# Patient Record
Sex: FEMALE | Race: WHITE | NOT HISPANIC OR LATINO | Employment: FULL TIME | URBAN - METROPOLITAN AREA
[De-identification: names, ages, dates, MRNs, and addresses within clinical notes are randomized per-mention and may not be internally consistent; named-entity substitution may affect disease eponyms.]

---

## 2017-02-24 ENCOUNTER — OFFICE VISIT (OUTPATIENT)
Dept: FAMILY MEDICINE | Facility: CLINIC | Age: 42
End: 2017-02-24
Payer: COMMERCIAL

## 2017-02-24 VITALS
BODY MASS INDEX: 20.74 KG/M2 | DIASTOLIC BLOOD PRESSURE: 82 MMHG | WEIGHT: 118 LBS | SYSTOLIC BLOOD PRESSURE: 110 MMHG | TEMPERATURE: 98.2 F | HEART RATE: 85 BPM

## 2017-02-24 DIAGNOSIS — J01.10 ACUTE NON-RECURRENT FRONTAL SINUSITIS: Primary | ICD-10-CM

## 2017-02-24 PROCEDURE — 99213 OFFICE O/P EST LOW 20 MIN: CPT | Performed by: INTERNAL MEDICINE

## 2017-02-24 RX ORDER — AZITHROMYCIN 250 MG/1
TABLET, FILM COATED ORAL
Qty: 6 TABLET | Refills: 0 | Status: SHIPPED | OUTPATIENT
Start: 2017-02-24 | End: 2018-06-22

## 2017-02-24 RX ORDER — CODEINE PHOSPHATE AND GUAIFENESIN 10; 100 MG/5ML; MG/5ML
1 SOLUTION ORAL EVERY 4 HOURS PRN
Qty: 120 ML | Refills: 0 | Status: SHIPPED | OUTPATIENT
Start: 2017-02-24 | End: 2018-06-22

## 2017-02-24 NOTE — NURSING NOTE
"Chief Complaint   Patient presents with     Sinus Problem     sinus congestion and head pressure for the last week. dry cough started yesterday and she noticed some white dots on her throat last night.        Initial /82 (BP Location: Left arm, Patient Position: Chair, Cuff Size: Adult Regular)  Pulse 85  Temp 98.2  F (36.8  C) (Tympanic)  Wt 118 lb (53.5 kg)  BMI 20.74 kg/m2 Estimated body mass index is 20.74 kg/(m^2) as calculated from the following:    Height as of 8/5/16: 5' 3.25\" (1.607 m).    Weight as of this encounter: 118 lb (53.5 kg).  Medication Reconciliation: complete   Yoana Pichardo CMA    "

## 2017-02-24 NOTE — PROGRESS NOTES
SUBJECTIVE:                                                    Melania Moya is a 41 year old female who presents to clinic today for the following health issues:    Chief Complaint   Patient presents with     Sinus Problem     sinus congestion and head pressure for the last week. dry cough started yesterday and she noticed some white dots on her throat last night.      ENT Symptoms           Symptoms: cc Present Absent Comment   Fever/Chills   x    Fatigue  x     Muscle Aches  x     Eye Irritation   x    Sneezing   x    Nasal Chip/Drg  x     Sinus Pressure/Pain  x  Frontal and maxillary   Loss of smell   x    Dental pain   x    Sore Throat  x  mild   Swollen Glands  x     Ear Pain/Fullness  x  bilateral   Cough  x  Dry and non productive, bad overnight   Wheeze   x    Chest Pain   x    Shortness of breath   x    Rash   x    Other  x  Headaches/pressure      Symptom duration:  a week   Symptom severity:  moderate   Treatments tried:  mucinex and dayquil    Contacts:  son had some congestion.        Problem list and histories reviewed & adjusted, as indicated.  Additional history: as documented    Current Outpatient Prescriptions   Medication Sig Dispense Refill     azithromycin (ZITHROMAX) 250 MG tablet Two tablets first day, then one tablet daily for four days. 6 tablet 0     guaiFENesin-codeine (ROBITUSSIN AC) 100-10 MG/5ML SOLN solution Take 5 mLs by mouth every 4 hours as needed for cough 120 mL 0     levonorgestrel (MIRENA) 20 MCG/24HR IUD 1 each (20 mcg) by Intrauterine route continuous       Multiple Vitamin (DAILY MULTIVITAMIN PO) Take  by mouth.       Allergies   Allergen Reactions     Bactrim [Sulfamethoxazole W-Trimethoprim] Hives     Penicillins Hives       ROS:  Constitutional, HEENT, pulmonary systems are negative, except as otherwise noted.    OBJECTIVE:                                                    /82 (BP Location: Left arm, Patient Position: Chair, Cuff Size: Adult Regular)   Pulse 85  Temp 98.2  F (36.8  C) (Tympanic)  Wt 118 lb (53.5 kg)  BMI 20.74 kg/m2  Body mass index is 20.74 kg/(m^2).    GENERAL: alert and no distress  EYES: Eyes grossly normal to inspection, PERRL and conjunctivae and sclerae normal  HENT: ear canals and TMs normal except for injection in right TM, frontal sinuses are tender to percussion, nose and mouth without ulcers or lesions, oropharynx red but no tonsillar exudates  NECK: no adenopathy, no asymmetry, masses, or scars  RESP: lungs clear to auscultation - no rales, rhonchi or wheezes  CV: regular rate and rhythm, normal S1 S2, no S3 or S4, no murmur, click or rub        ASSESSMENT/PLAN:                                                        1. Acute non-recurrent frontal sinusitis    Sinus symptoms are approaching the 10 day zahida and are continuing to worsen, so we'll treat with antibiotics.  She does have a penicillin allergy, so will use azithromycin.  Codeine cough syrup at night as needed.  Continue other OTCs.      - azithromycin (ZITHROMAX) 250 MG tablet; Two tablets first day, then one tablet daily for four days.  Dispense: 6 tablet; Refill: 0  - guaiFENesin-codeine (ROBITUSSIN AC) 100-10 MG/5ML SOLN solution; Take 5 mLs by mouth every 4 hours as needed for cough  Dispense: 120 mL; Refill: 0    Follow-up as needed.      Neri Stanton MD  [facility]

## 2017-02-24 NOTE — MR AVS SNAPSHOT
After Visit Summary   2/24/2017    Melania Moya    MRN: [id number]           Patient Information     Date Of Birth          1975        Visit Information        Provider Department      2/24/2017 11:20 AM Neri Stanton MD [facility]        Today's Diagnoses     Acute non-recurrent frontal sinusitis    -  1      Care Instructions    Try some Flonase to help your sinuses drain.  When spraying into the nose, aim towards the ears for the best effect.         Thank you for choosing [facility].  You may be receiving a survey in the mail from [facility] your visit today.  Please take a few minutes to complete and return the survey to let us know how we are doing.      Our Clinic hours are:  Mondays    7:20 am - 7 pm  Tues -  Fri  7:20 am - 5 pm    Clinic Phone: [phone]    The clinic lab opens at 7:30 am Mon - Fri and appointments are required.    [facility]. [phone]  Monday-Thursday 8 am - 7pm  Tues/Wed/Fri 8 am - 5:30 pm             Follow-ups after your visit        Who to contact     If you have questions or need follow up information about today's clinic visit or your schedule please contact [facility] directly at [phone].  Normal or non-critical lab and imaging results will be communicated to you by Spex Grouphart, letter or phone within 4 business days after the clinic has received the results. If you do not hear from us within 7 days, please contact the clinic through Spe[employer] Grou[employer] or phone. If you have a critical or abnormal lab result, we will notify you by phone as soon as possible.  Submit refill requests through Kera or call your pharmacy and they will forward the refill request to us. Please allow 3 business days for your refill to be completed.          Additional Information About Your Visit        [employer] Information     Kera gives you secure access to your electronic health record. If you see a primary  care provider, you can also send messages to your care team and make appointments. If you have questions, please call your primary care clinic.  If you do not have a primary care provider, please call [phone] and they will assist you.        Care EveryWhere ID     This is your Care EveryWhere ID. This could be used by other organizations to access your [place] medical records  [id number]        Your Vitals Were     Pulse Temperature BMI (Body Mass Index)             85 98.2  F (36.8  C) (Tympanic) 20.74 kg/m2          Blood Pressure from Last 3 Encounters:   02/24/17 110/82   08/05/16 123/83   12/12/14 110/66    Weight from Last 3 Encounters:   02/24/17 118 lb (53.5 kg)   08/05/16 112 lb 9.6 oz (51.1 kg)   12/12/14 115 lb (52.2 kg)              Today, you had the following     No orders found for display         Today's Medication Changes          These changes are accurate as of: 2/24/17 11:55 AM.  If you have any questions, ask your nurse or doctor.               Start taking these medicines.        Dose/Directions    azithromycin 250 MG tablet   Commonly known as:  ZITHROMAX   Used for:  Acute non-recurrent frontal sinusitis   Started by:  Neri Stanton MD        Two tablets first day, then one tablet daily for four days.   Quantity:  6 tablet   Refills:  0       guaiFENesin-codeine 100-10 MG/5ML Soln solution   Commonly known as:  ROBITUSSIN AC   Used for:  Acute non-recurrent frontal sinusitis   Started by:  Neri Stanton MD        Dose:  1 tsp.   Take 5 mLs by mouth every 4 hours as needed for cough   Quantity:  120 mL   Refills:  0            Where to get your medicines      These medications were sent to [id number] - [place] [address], [facility] [address]     Phone:  [phone]     azithromycin 250 MG tablet         Some of these will need a paper prescription and others can be bought over the counter.  Ask your nurse if you have  questions.     Bring a paper prescription for each of these medications     guaiFENesin-codeine 100-10 MG/5ML Soln solution                Primary Care Provider    Doctor Unknown, MD       No address on file        Thank you!     Thank you for choosing [facility]  for your care. Our goal is always to provide you with excellent care. Hearing back from our patients is one way we can continue to improve our services. Please take a few minutes to complete the written survey that you may receive in the mail after your visit with us. Thank you!             Your Updated Medication List - Protect others around you: Learn how to safely use, store and throw away your medicines at www.disposemymeds.org.          This list is accurate as of: 2/24/17 11:55 AM.  Always use your most recent med list.                   Brand Name Dispense Instructions for use    azithromycin 250 MG tablet    ZITHROMAX    6 tablet    Two tablets first day, then one tablet daily for four days.       DAILY MULTIVITAMIN PO      Take  by mouth.       guaiFENesin-codeine 100-10 MG/5ML Soln solution    ROBITUSSIN AC    120 mL    Take 5 mLs by mouth every 4 hours as needed for cough       levonorgestrel 20 MCG/24HR IUD    MIRENA     1 each (20 mcg) by Intrauterine route continuous

## 2017-02-24 NOTE — PATIENT INSTRUCTIONS
Try some Flonase to help your sinuses drain.  When spraying into the nose, aim towards the ears for the best effect.         Thank you for choosing [facility].  You may be receiving a survey in the mail from Sachin Salazar regarding your visit today.  Please take a few minutes to complete and return the survey to let us know how we are doing.      Our Clinic hours are:  Mondays    7:20 am - 7 pm  Tues -  Fri  7:20 am - 5 pm    Clinic Phone: [phone]    The clinic lab opens at 7:30 am Mon - Fri and appointments are required.    [facility]  Ph. [phone]  Monday-Thursday 8 am - 7pm  Tues/Wed/Fri 8 am - 5:30 pm

## 2018-01-08 ENCOUNTER — HOSPITAL ENCOUNTER (OUTPATIENT)
Dept: MAMMOGRAPHY | Facility: CLINIC | Age: 43
Discharge: HOME | End: 2018-01-08
Attending: OBSTETRICS & GYNECOLOGY | Admitting: OBSTETRICS & GYNECOLOGY
Payer: COMMERCIAL

## 2018-01-08 DIAGNOSIS — Z12.31 VISIT FOR SCREENING MAMMOGRAM: ICD-10-CM

## 2018-01-08 PROCEDURE — 77067 SCR MAMMO BI INCL CAD: CPT

## 2018-01-27 ENCOUNTER — HEALTH MAINTENANCE LETTER (OUTPATIENT)
Age: 43
End: 2018-01-27

## 2018-06-22 ENCOUNTER — OFFICE VISIT (OUTPATIENT)
Dept: OBGYN | Facility: CLINIC | Age: 43
End: 2018-06-22
Payer: COMMERCIAL

## 2018-06-22 VITALS
SYSTOLIC BLOOD PRESSURE: 110 MMHG | DIASTOLIC BLOOD PRESSURE: 70 MMHG | WEIGHT: 116.6 LBS | BODY MASS INDEX: 20.66 KG/M2 | HEART RATE: 10 BPM | RESPIRATION RATE: 20 BRPM | TEMPERATURE: 97.1 F | HEIGHT: 63 IN

## 2018-06-22 DIAGNOSIS — Z71.6 ENCOUNTER FOR SMOKING CESSATION COUNSELING: Primary | ICD-10-CM

## 2018-06-22 DIAGNOSIS — Z01.419 WELL WOMAN EXAM WITH ROUTINE GYNECOLOGICAL EXAM: ICD-10-CM

## 2018-06-22 PROCEDURE — G0145 SCR C/V CYTO,THINLAYER,RESCR: HCPCS | Performed by: ADVANCED PRACTICE MIDWIFE

## 2018-06-22 PROCEDURE — 87591 N.GONORRHOEAE DNA AMP PROB: CPT | Performed by: ADVANCED PRACTICE MIDWIFE

## 2018-06-22 PROCEDURE — 87491 CHLMYD TRACH DNA AMP PROBE: CPT | Performed by: ADVANCED PRACTICE MIDWIFE

## 2018-06-22 PROCEDURE — 99396 PREV VISIT EST AGE 40-64: CPT | Performed by: ADVANCED PRACTICE MIDWIFE

## 2018-06-22 PROCEDURE — 87624 HPV HI-RISK TYP POOLED RSLT: CPT | Performed by: ADVANCED PRACTICE MIDWIFE

## 2018-06-22 RX ORDER — NICOTINE 21 MG/24HR
1 PATCH, TRANSDERMAL 24 HOURS TRANSDERMAL EVERY 24 HOURS
Qty: 30 PATCH | Refills: 11 | Status: SHIPPED | OUTPATIENT
Start: 2018-06-22 | End: 2020-03-02

## 2018-06-22 NOTE — MR AVS SNAPSHOT
After Visit Summary   6/22/2018    Melania Moya    MRN: [id number]           Patient Information     Date Of Birth          1975        Visit Information        Provider Department      6/22/2018 2:30 PM Janis Simon CNM [facility]        Today's Diagnoses     Encounter for smoking cessation counseling    -  1    Well woman exam with routine gynecological exam          Care Instructions      Preventive Health Recommendations  Female Ages 40 to 49    Yearly exam:     See your health care provider every year in order to  1. Review health changes.   2. Discuss preventive care.    3. Review your medicines if your doctor prescribed any.      Get a Pap test every three years (unless you have an abnormal result and your provider advises testing more often).      If you get Pap tests with HPV test, you only need to test every 5 years, unless you have an abnormal result. You do not need a Pap test if your uterus was removed (hysterectomy) and you have not had cancer.      You should be tested each year for STDs (sexually transmitted diseases), if you're at risk.       Ask your doctor if you should have a mammogram.      Have a colonoscopy (test for colon cancer) if someone in your family has had colon cancer or polyps before age 50.       Have a cholesterol test every 5 years.       Have a diabetes test (fasting glucose) after age 45. If you are at risk for diabetes, you should have this test every 3 years.    Shots: Get a flu shot each year. Get a tetanus shot every 10 years.     Nutrition:     Eat at least 5 servings of fruits and vegetables each day.    Eat whole-grain bread, whole-wheat pasta and brown rice instead of white grains and rice.    Talk to your provider about Calcium and Vitamin D.     Lifestyle    Exercise at least 150 minutes a week (an average of 30 minutes a day, 5 days a week). This will help you control your weight and prevent disease.    Limit  "alcohol to one drink per day.    No smoking.     Wear sunscreen to prevent skin cancer.    See your dentist every six months for an exam and cleaning.                    Follow-ups after your visit        Follow-up notes from your care team     Return in about 1 year (around 6/22/2019) for Physical Exam.      Who to contact     If you have questions or need follow up information about today's clinic visit or your schedule please contact [facility] directly at [phone].  Normal or non-critical lab and imaging results will be communicated to you by DigiwinSofthart, letter or phone within 4 business days after the clinic has received the results. If you do not hear from us within 7 days, please contact the clinic through [email or web address] or phone. If you have a critical or abnormal lab result, we will notify you by phone as soon as possible.  Submit refill requests through CatchTheEye or call your pharmacy and they will forward the refill request to us. Please allow 3 business days for your refill to be completed.          Additional Information About Your Visit        DigiwinSofthart Information     CatchTheEye gives you secure access to your electronic health record. If you see a primary care provider, you can also send messages to your care team and make appointments. If you have questions, please call your primary care clinic.  If you do not have a primary care provider, please call [phone] and they will assist you.        Care EveryWhere ID     This is your Care EveryWhere ID. This could be used by other organizations to access your [place] medical records  [id number]        Your Vitals Were     Pulse Temperature Respirations Height BMI (Body Mass Index)       10 97.1  F (36.2  C) 20 5' 3.25\" (1.607 m) 20.49 kg/m2        Blood Pressure from Last 3 Encounters:   06/22/18 110/70   02/24/17 110/82   08/05/16 123/83    Weight from Last 3 Encounters:   06/22/18 116 lb 9.6 oz (52.9 kg)   02/24/17 118 lb (53.5 kg)   08/05/16 " 112 lb 9.6 oz (51.1 kg)              We Performed the Following     HPV High Risk Types DNA Cervical     Pap imaged thin layer screen with HPV - recommended age 30 - 65 years (select HPV order below)          Today's Medication Changes          These changes are accurate as of 6/22/18  3:15 PM.  If you have any questions, ask your nurse or doctor.               Start taking these medicines.        Dose/Directions    nicotine 14 MG/24HR 24 hr patch   Commonly known as:  NICODERM CQ   Used for:  Encounter for smoking cessation counseling   Started by:  Janis Simon CNM        Dose:  1 patch   Place 1 patch onto the skin every 24 hours   Quantity:  30 patch   Refills:  11       nicotine polacrilex 4 MG gum   Commonly known as:  EQ NICOTINE   Used for:  Encounter for smoking cessation counseling   Started by:  Janis Simon CNM        Dose:  4 mg   Place 1 each (4 mg) inside cheek as needed for smoking cessation   Quantity:  270 tablet   Refills:  3         Stop taking these medicines if you haven't already. Please contact your care team if you have questions.     azithromycin 250 MG tablet   Commonly known as:  ZITHROMAX   Stopped by:  Janis Simon CNM           guaiFENesin-codeine 100-10 MG/5ML Soln solution   Commonly known as:  ROBITUSSIN AC   Stopped by:  Janis Simon CNM                Where to get your medicines      These medications were sent to [id number] [place] [id number] [place] [address], [facility] [address]     Phone:  [phone]     nicotine 14 MG/24HR 24 hr patch    nicotine polacrilex 4 MG gum                Primary Care Provider Fax #    Physician No Ref-Primary [phone]       No address on file        Equal Access to Services     [facility] GERBER AH: Riri Hurd, sergei preston, shabana arauzalbibi meeks, na de leon. So [facility]  [phone].    ATENCIÓN: Si jones srinivasan, tiene a kyees disposición servicios gratuitos de asistencia lingüística. Abigail sarkar [phone].    We comply with applicable federal civil rights laws and [place] laws. We do not discriminate on the basis of race, color, national origin, age, disability, sex, sexual orientation, or gender identity.            Thank you!     Thank you for choosing [facility]  for your care. Our goal is always to provide you with excellent care. Hearing back from our patients is one way we can continue to improve our services. Please take a few minutes to complete the written survey that you may receive in the mail after your visit with us. Thank you!             Your Updated Medication List - Protect others around you: Learn how to safely use, store and throw away your medicines at www.disposemymeds.org.          This list is accurate as of 6/22/18  3:15 PM.  Always use your most recent med list.                   Brand Name Dispense Instructions for use Diagnosis    DAILY MULTIVITAMIN PO      Take  by mouth.        levonorgestrel 20 MCG/24HR IUD    MIRENA     1 each (20 mcg) by Intrauterine route continuous    Encounter for IUD insertion       nicotine 14 MG/24HR 24 hr patch    NICODERM CQ    30 patch    Place 1 patch onto the skin every 24 hours    Encounter for smoking cessation counseling       nicotine polacrilex 4 MG gum    EQ NICOTINE    270 tablet    Place 1 each (4 mg) inside cheek as needed for smoking cessation    Encounter for smoking cessation counseling

## 2018-06-22 NOTE — PATIENT INSTRUCTIONS
Preventive Health Recommendations  Female Ages 40 to 49    Yearly exam:     See your health care provider every year in order to  1. Review health changes.   2. Discuss preventive care.    3. Review your medicines if your doctor prescribed any.      Get a Pap test every three years (unless you have an abnormal result and your provider advises testing more often).      If you get Pap tests with HPV test, you only need to test every 5 years, unless you have an abnormal result. You do not need a Pap test if your uterus was removed (hysterectomy) and you have not had cancer.      You should be tested each year for STDs (sexually transmitted diseases), if you're at risk.       Ask your doctor if you should have a mammogram.      Have a colonoscopy (test for colon cancer) if someone in your family has had colon cancer or polyps before age 50.       Have a cholesterol test every 5 years.       Have a diabetes test (fasting glucose) after age 45. If you are at risk for diabetes, you should have this test every 3 years.    Shots: Get a flu shot each year. Get a tetanus shot every 10 years.     Nutrition:     Eat at least 5 servings of fruits and vegetables each day.    Eat whole-grain bread, whole-wheat pasta and brown rice instead of white grains and rice.    Talk to your provider about Calcium and Vitamin D.     Lifestyle    Exercise at least 150 minutes a week (an average of 30 minutes a day, 5 days a week). This will help you control your weight and prevent disease.    Limit alcohol to one drink per day.    No smoking.     Wear sunscreen to prevent skin cancer.    See your dentist every six months for an exam and cleaning.

## 2018-06-22 NOTE — PROGRESS NOTES
SUBJECTIVE:   CC: Melania Moya is an 42 year old woman who presents for preventive health visit.     Sexual history: Patient reports not currently sexually active  Requests GC/CT testing today  Paps: No abnormal paps  Mammogram: Normal in 01/2018  BCM: Mirena, due out in 2019. No problems with this  LMP: None, due to Mirena    Healthy Habits:    Do you get at least three servings of calcium containing foods daily (dairy, green leafy vegetables, etc.)? yes    Amount of exercise or daily activities, outside of work: 5 day(s) per week    Problems taking medications regularly No    Medication side effects: No    Have you had an eye exam in the past two years? no    Do you see a dentist twice per year? yes    Do you have sleep apnea, excessive snoring or daytime drowsiness?no          Today's PHQ-2 Score:   PHQ-2 ( 1999 Pfizer) 6/22/2018 11/24/2014   Q1: Little interest or pleasure in doing things 0 0   Q2: Feeling down, depressed or hopeless 1 0   PHQ-2 Score 1 0       Abuse: Current or Past(Physical, Sexual or Emotional)- No  Do you feel safe in your environment - Yes    Social History   Substance Use Topics     Smoking status: Current Every Day Smoker     Packs/day: 0.50     Years: 20.00     Types: Cigarettes     Smokeless tobacco: Never Used      Comment: not every day     Alcohol use 0.0 oz/week     0 Standard drinks or equivalent per week      Comment: occ     If you drink alcohol do you typically have >3 drinks per day or >7 drinks per week? No    Patient under age 50, mutual decision reflected in health maintenance. Mammogram normal in 01/2018      Pertinent mammograms are reviewed under the imaging tab.  History of abnormal Pap smear: NO - age 30-65 PAP every 5 years with negative HPV co-testing recommended    Reviewed and updated as needed this visit by clinical staff  Tobacco  Allergies  Med Hx  Surg Hx  Fam Hx  Soc Hx        Reviewed and updated as needed this visit by Provider       "      ROS:  CONSTITUTIONAL: NEGATIVE for fever, chills, change in weight  INTEGUMENTARU/SKIN: NEGATIVE for worrisome rashes, moles or lesions  EYES: NEGATIVE for vision changes or irritation  ENT: NEGATIVE for ear, mouth and throat problems  RESP: NEGATIVE for significant cough or SOB  BREAST: NEGATIVE for masses, tenderness or discharge  CV: NEGATIVE for chest pain, palpitations or peripheral edema  GI: NEGATIVE for nausea, abdominal pain, heartburn, or change in bowel habits  : NEGATIVE for unusual urinary or vaginal symptoms. Periods are regular.  MUSCULOSKELETAL: NEGATIVE for significant arthralgias or myalgia  NEURO: NEGATIVE for weakness, dizziness or paresthesias  PSYCHIATRIC: NEGATIVE for changes in mood or affect    OBJECTIVE:   /70  Pulse (!) 10  Temp 97.1  F (36.2  C)  Resp 20  Ht 5' 3.25\" (1.607 m)  Wt 116 lb 9.6 oz (52.9 kg)  LMP   BMI 20.49 kg/m2  EXAM:  GENERAL: healthy, alert and no distress  NECK: no adenopathy, no asymmetry, masses, or scars and thyroid normal to palpation  RESP: lungs clear to auscultation - no rales, rhonchi or wheezes  CV: regular rate and rhythm, normal S1 S2, no S3 or S4, no murmur, click or rub, no peripheral edema and peripheral pulses strong  ABDOMEN: soft, nontender, no hepatosplenomegaly, no masses and bowel sounds normal   (female): normal female external genitalia, normal urethral meatus, vaginal mucosa, normal cervix/adnexa/uterus without masses or discharge  MS: no gross musculoskeletal defects noted, no edema    ASSESSMENT/PLAN:     Encounter Diagnoses   Name Primary?     Encounter for smoking cessation counseling Yes     Well woman exam with routine gynecological exam      Pap +HPV today, follow up with results  GC/CT screening today    Discussed smoking cessation. Patient would like help quitting. Handouts provided. Did not like Chantix. Plan 2 forms of nicotine replacement, patch + gum ordered. Follow up with primary care for ongoing support. " "      COUNSELING:   Reviewed preventive health counseling, as reflected in patient instructions  Special attention given to:        Smoking cessation         reports that she has been smoking Cigarettes.  She has a 10.00 pack-year smoking history. She has never used smokeless tobacco.  Tobacco Cessation Action Plan: Pharmacotherapies : Nicotine patch and other Nicotine replacement  Self help information given to patient  Estimated body mass index is 20.49 kg/(m^2) as calculated from the following:    Height as of this encounter: 5' 3.25\" (1.607 m).    Weight as of this encounter: 116 lb 9.6 oz (52.9 kg).       Janis Simon [facility]  "

## 2018-06-24 LAB
C TRACH DNA SPEC QL NAA+PROBE: NEGATIVE
N GONORRHOEA DNA SPEC QL NAA+PROBE: NEGATIVE
SPECIMEN SOURCE: NORMAL
SPECIMEN SOURCE: NORMAL

## 2018-06-26 LAB
COPATH REPORT: NORMAL
PAP: NORMAL

## 2018-06-26 NOTE — PROGRESS NOTES
Will forward to [facility] pool for pt notification of normal result.    Eloise Weinstein   [facility]  RN

## 2018-06-27 LAB
FINAL DIAGNOSIS: NORMAL
HPV HR 12 DNA CVX QL NAA+PROBE: NEGATIVE
HPV16 DNA SPEC QL NAA+PROBE: NEGATIVE
HPV18 DNA SPEC QL NAA+PROBE: NEGATIVE
SPECIMEN DESCRIPTION: NORMAL
SPECIMEN SOURCE CVX/VAG CYTO: NORMAL

## 2018-10-08 ENCOUNTER — OFFICE VISIT (OUTPATIENT)
Dept: FAMILY MEDICINE | Facility: CLINIC | Age: 43
End: 2018-10-08
Payer: COMMERCIAL

## 2018-10-08 VITALS
TEMPERATURE: 98.6 F | BODY MASS INDEX: 20.73 KG/M2 | SYSTOLIC BLOOD PRESSURE: 120 MMHG | WEIGHT: 117 LBS | RESPIRATION RATE: 16 BRPM | DIASTOLIC BLOOD PRESSURE: 72 MMHG | HEART RATE: 84 BPM | OXYGEN SATURATION: 100 % | HEIGHT: 63 IN

## 2018-10-08 DIAGNOSIS — J20.9 ACUTE BRONCHITIS WITH SYMPTOMS > 10 DAYS: Primary | ICD-10-CM

## 2018-10-08 PROCEDURE — 99213 OFFICE O/P EST LOW 20 MIN: CPT | Performed by: NURSE PRACTITIONER

## 2018-10-08 RX ORDER — AZITHROMYCIN 250 MG/1
TABLET, FILM COATED ORAL
Qty: 6 TABLET | Refills: 0 | Status: SHIPPED | OUTPATIENT
Start: 2018-10-08 | End: 2020-03-02

## 2018-10-08 RX ORDER — BENZONATATE 200 MG/1
200 CAPSULE ORAL 3 TIMES DAILY PRN
Qty: 21 CAPSULE | Refills: 0 | Status: SHIPPED | OUTPATIENT
Start: 2018-10-08 | End: 2020-03-02

## 2018-10-08 RX ORDER — CODEINE PHOSPHATE AND GUAIFENESIN 10; 100 MG/5ML; MG/5ML
1-2 SOLUTION ORAL EVERY 4 HOURS PRN
Qty: 180 ML | Refills: 0 | Status: SHIPPED | OUTPATIENT
Start: 2018-10-08 | End: 2020-03-02

## 2018-10-08 NOTE — PROGRESS NOTES
SUBJECTIVE:   Melania Moya is a 43 year old female who presents to clinic today for the following health issues:      ENT Symptoms             Symptoms: cc Present Absent Comment   Fever/Chills   x    Fatigue  x     Muscle Aches  x     Eye Irritation  x  mild   Sneezing   x    Nasal Chip/Drg x x     Sinus Pressure/Pain x x     Loss of smell   x    Dental pain  x  mild   Sore Throat  x  From cough   Swollen Glands   x    Ear Pain/Fullness   x    Cough x x  Productive at times-green sputum   Wheeze  x     Chest Pain   x    Shortness of breath   x    Rash   x    Other         Symptom duration:  started with a cold 2 weeks ago, moved into chest    Symptom severity:  mild to moderate   Treatments tried:  cough drops, robitussin, nyquil, emergen-c   Contacts:  none       Problem list and histories reviewed & adjusted, as indicated.  Additional history: as documented    Patient Active Problem List   Diagnosis     Tobacco use disorder     Breast lump     Reactive depression (situational)     Past Surgical History:   Procedure Laterality Date     C ORAL SURGERY PROCEDURE  age 18    Desmet teeth       Social History   Substance Use Topics     Smoking status: Current Every Day Smoker     Packs/day: 0.50     Years: 20.00     Types: Cigarettes     Smokeless tobacco: Never Used      Comment: not every day     Alcohol use 0.0 oz/week     0 Standard drinks or equivalent per week      Comment: occ     Family History   Problem Relation Age of Onset     Cancer Father      lymphoma     Family History Negative Other      Breast Cancer No family hx of      Cancer - colorectal No family hx of            Reviewed and updated as needed this visit by clinical staff       Reviewed and updated as needed this visit by Provider         ROS:  Constitutional, HEENT, cardiovascular, pulmonary, gi and gu systems are negative, except as otherwise noted.    OBJECTIVE:     /72 (BP Location: Right arm, Patient Position: Sitting, Cuff  "Size: Adult Regular)  Pulse 84  Temp 98.6  F (37  C) (Tympanic)  Resp 16  Ht 5' 3.25\" (1.607 m)  Wt 117 lb (53.1 kg)  SpO2 100%  BMI 20.56 kg/m2  Body mass index is 20.56 kg/(m^2).  GENERAL: healthy, alert and no distress  EYES: Eyes grossly normal to inspection, PERRL and conjunctivae and sclerae normal  HENT: ear canals and TM's normal, nose and mouth without ulcers or lesions  NECK: no adenopathy, no asymmetry, masses, or scars and thyroid normal to palpation  RESP: no rales , rhonchi throughout and expiratory wheezes bilateral  CV: regular rates and rhythm, normal S1 S2, no S3 or S4 and no murmur, click or rub  SKIN: no suspicious lesions or rashes  NEURO: Normal strength and tone, mentation intact and speech normal    Diagnostic Test Results:  none     ASSESSMENT/PLAN:       ICD-10-CM    1. Acute bronchitis with symptoms > 10 days J20.9 azithromycin (ZITHROMAX) 250 MG tablet     benzonatate (TESSALON) 200 MG capsule     guaiFENesin-codeine (ROBITUSSIN AC) 100-10 MG/5ML SOLN solution       FUTURE APPOINTMENTS:       - Follow up in 1 week for persistent symptoms, sooner for new or worsening symptoms.     Patient Instructions     Bronchitis, Antibiotic Treatment (Adult)    Bronchitis is an infection of the air passages (bronchial tubes) in your lungs. It often occurs when you have a cold. This illness is contagious during the first few days and is spread through the air by coughing and sneezing, or by direct contact (touching the sick person and then touching your own eyes, nose, or mouth).  Symptoms of bronchitis include cough with mucus (phlegm) and low-grade fever. Bronchitis usually lasts 7 to 14 days. Mild cases can be treated with simple home remedies. More severe infection is treated with an antibiotic.  Home care  Follow these guidelines when caring for yourself at home:    If your symptoms are severe, rest at home for the first 2 to 3 days. When you go back to your usual activities, don't let " yourself get too tired.    Do not smoke. Also avoid being exposed to secondhand smoke.    You may use over-the-counter medicines to control fever or pain, unless another medicine was prescribed. If you have chronic liver or kidney disease or have ever had a stomach ulcer or gastrointestinal bleeding, talk with your healthcare provider before using these medicines. Also talk to your provider if you are taking medicine to prevent blood clots. Aspirin should never be given to anyone younger than 18 years of age who is ill with a viral infection or fever. It may cause severe liver or brain damage.    Your appetite may be poor, so a light diet is fine. Avoid dehydration by drinking 6 to 8 glasses of fluids per day (such as water, soft drinks, sports drinks, juices, tea, or soup). Extra fluids will help loosen secretions in the nose and lungs.    Over-the-counter cough, cold, and sore-throat medicines will not shorten the length of the illness, but they may be helpful to reduce symptoms. (Note: Do not use decongestants if you have high blood pressure.)    Finish all antibiotic medicine. Do this even if you are feeling better after only a few days.  Follow-up care  Follow up with your healthcare provider, or as advised. If you had an X-ray or ECG (electrocardiogram), a specialist will review it. You will be notified of any new findings that may affect your care.  If you are age 65 or older, or if you have a chronic lung disease or condition that affects your immune system, or you smoke, ask your healthcare provider about getting a pneumococcal vaccine and a yearly flu shot (influenza vaccine).  When to seek medical advice  Call your healthcare provider right away if any of these occur:    Fever of 100.4 F (38 C) or higher, or as directed by your healthcare provider    Coughing up increased amounts of colored sputum    Weakness, drowsiness, headache, facial pain, ear pain, or a stiff neck  Call 911  Call 911 if any of these  occur.    Coughing up blood    Worsening weakness, drowsiness, headache, or stiff neck    Trouble breathing, wheezing, or pain with breathing  Date Last Reviewed: 9/13/2015 2000-2017 [employer]. [address] [place] [address], [place] [address]. All rights reserved. This information is not intended as a substitute for professional medical care. Always follow your healthcare professional's instructions.            ELBA Cevallos [facility]

## 2018-10-08 NOTE — PATIENT INSTRUCTIONS

## 2018-10-08 NOTE — MR AVS SNAPSHOT
After Visit Summary   10/8/2018    Melania Moya    MRN: [id number]           Patient Information     Date Of Birth          1975        Visit Information        Provider Department      10/8/2018 3:00 PM Citlaly Rock APRN [facility]        Today's Diagnoses     Acute bronchitis with symptoms > 10 days    -  1      Care Instructions      Bronchitis, Antibiotic Treatment (Adult)    Bronchitis is an infection of the air passages (bronchial tubes) in your lungs. It often occurs when you have a cold. This illness is contagious during the first few days and is spread through the air by coughing and sneezing, or by direct contact (touching the sick person and then touching your own eyes, nose, or mouth).  Symptoms of bronchitis include cough with mucus (phlegm) and low-grade fever. Bronchitis usually lasts 7 to 14 days. Mild cases can be treated with simple home remedies. More severe infection is treated with an antibiotic.  Home care  Follow these guidelines when caring for yourself at home:    If your symptoms are severe, rest at home for the first 2 to 3 days. When you go back to your usual activities, don't let yourself get too tired.    Do not smoke. Also avoid being exposed to secondhand smoke.    You may use over-the-counter medicines to control fever or pain, unless another medicine was prescribed. If you have chronic liver or kidney disease or have ever had a stomach ulcer or gastrointestinal bleeding, talk with your healthcare provider before using these medicines. Also talk to your provider if you are taking medicine to prevent blood clots. Aspirin should never be given to anyone younger than 18 years of age who is ill with a viral infection or fever. It may cause severe liver or brain damage.    Your appetite may be poor, so a light diet is fine. Avoid dehydration by drinking 6 to 8 glasses of fluids per day (such as water, soft drinks, sports drinks, juices,  tea, or soup). Extra fluids will help loosen secretions in the nose and lungs.    Over-the-counter cough, cold, and sore-throat medicines will not shorten the length of the illness, but they may be helpful to reduce symptoms. (Note: Do not use decongestants if you have high blood pressure.)    Finish all antibiotic medicine. Do this even if you are feeling better after only a few days.  Follow-up care  Follow up with your healthcare provider, or as advised. If you had an X-ray or ECG (electrocardiogram), a specialist will review it. You will be notified of any new findings that may affect your care.  If you are age 65 or older, or if you have a chronic lung disease or condition that affects your immune system, or you smoke, ask your healthcare provider about getting a pneumococcal vaccine and a yearly flu shot (influenza vaccine).  When to seek medical advice  Call your healthcare provider right away if any of these occur:    Fever of 100.4 F (38 C) or higher, or as directed by your healthcare provider    Coughing up increased amounts of colored sputum    Weakness, drowsiness, headache, facial pain, ear pain, or a stiff neck  Call 911  Call 911 if any of these occur.    Coughing up blood    Worsening weakness, drowsiness, headache, or stiff neck    Trouble breathing, wheezing, or pain with breathing  Date Last Reviewed: 9/13/2015 2000-2017 [employer]. [address] [place] [address]. All rights reserved. This information is not intended as a substitute for professional medical care. Always follow your healthcare professional's instructions.                Follow-ups after your visit        Who to contact     Normal or non-critical lab and imaging results will be communicated to you by MyChart, letter or phone within 4 business days after the clinic has received the results. If you do not hear from us within 7 days, please contact the clinic through MyChart or phone. If you have a critical  "or abnormal lab result, we will notify you by phone as soon as possible.  Submit refill requests through [employer] or call your pharmacy and they will forward the refill request to us. Please allow 3 business days for your refill to be completed.          If you need to speak with a  for additional information , please call: [phone]             Additional Information About Your Visit        [employer] Information     [employer] gives you secure access to your electronic health record. If you see a primary care provider, you can also send messages to your care team and make appointments. If you have questions, please call your primary care clinic.  If you do not have a primary care provider, please call [phone] and they will assist you.        Care EveryWhere ID     This is your Care EveryWhere ID. This could be used by other organizations to access your [place] medical records  [id number]        Your Vitals Were     Pulse Temperature Respirations Height Pulse Oximetry BMI (Body Mass Index)    84 98.6  F (37  C) (Tympanic) 16 5' 3.25\" (1.607 m) 100% 20.56 kg/m2       Blood Pressure from Last 3 Encounters:   10/08/18 120/72   06/22/18 110/70   02/24/17 110/82    Weight from Last 3 Encounters:   10/08/18 117 lb (53.1 kg)   06/22/18 116 lb 9.6 oz (52.9 kg)   02/24/17 118 lb (53.5 kg)              Today, you had the following     No orders found for display         Today's Medication Changes          These changes are accurate as of 10/8/18  3:22 PM.  If you have any questions, ask your nurse or doctor.               Start taking these medicines.        Dose/Directions    azithromycin 250 MG tablet   Commonly known as:  ZITHROMAX   Used for:  Acute bronchitis with symptoms > 10 days   Started by:  Citlaly Rock APRN CNP        Two tablets first day, then one tablet daily for four days.   Quantity:  6 tablet   Refills:  0       benzonatate 200 MG capsule   Commonly known as:  TESSALON   Used " for:  Acute bronchitis with symptoms > 10 days   Started by:  Citlaly Rock APRN CNP        Dose:  200 mg   Take 1 capsule (200 mg) by mouth 3 times daily as needed for cough   Quantity:  21 capsule   Refills:  0       guaiFENesin-codeine 100-10 MG/5ML Soln solution   Commonly known as:  ROBITUSSIN AC   Used for:  Acute bronchitis with symptoms > 10 days   Started by:  Citlaly Rock APRN CNP        Dose:  1-2 tsp.   Take 5-10 mLs by mouth every 4 hours as needed for congestion   Quantity:  180 mL   Refills:  0            Where to get your medicines      These medications were sent to [id number] - [facility] [address] [facility]  [address] [facility] [address], [facility] [address]     Phone:  [phone]     azithromycin 250 MG tablet    benzonatate 200 MG capsule         Some of these will need a paper prescription and others can be bought over the counter.  Ask your nurse if you have questions.     Bring a paper prescription for each of these medications     guaiFENesin-codeine 100-10 MG/5ML Soln solution                Primary Care Provider Fax #    Physician No Ref-Primary [phone]       No address on file        Equal Access to Services     [facility] : Hadii cristal basurtoo Sorichie, waaxda luqadaha, qaybta kaalmada adeegyada, na de leon. So [facility] [phone].    ATENCIÓN: Si habla español, tiene a keyes disposición servicios gratuitos de asistencia lingüística. Llame al [phone].    We comply with applicable federal civil rights laws and [place] laws. We do not discriminate on the basis of race, color, national origin, age, disability, sex, sexual orientation, or gender identity.            Thank you!     Thank you for choosing [facility]  for your care. Our goal is always to provide you with excellent care. Hearing back from our patients is one way we can continue to improve our services. Please take a few minutes to  complete the written survey that you may receive in the mail after your visit with us. Thank you!             Your Updated Medication List - Protect others around you: Learn how to safely use, store and throw away your medicines at www.disposemymeds.org.          This list is accurate as of 10/8/18  3:22 PM.  Always use your most recent med list.                   Brand Name Dispense Instructions for use Diagnosis    azithromycin 250 MG tablet    ZITHROMAX    6 tablet    Two tablets first day, then one tablet daily for four days.    Acute bronchitis with symptoms > 10 days       benzonatate 200 MG capsule    TESSALON    21 capsule    Take 1 capsule (200 mg) by mouth 3 times daily as needed for cough    Acute bronchitis with symptoms > 10 days       DAILY MULTIVITAMIN PO      Take  by mouth.        guaiFENesin-codeine 100-10 MG/5ML Soln solution    ROBITUSSIN AC    180 mL    Take 5-10 mLs by mouth every 4 hours as needed for congestion    Acute bronchitis with symptoms > 10 days       levonorgestrel 20 MCG/24HR IUD    MIRENA     1 each (20 mcg) by Intrauterine route continuous    Encounter for IUD insertion       nicotine 14 MG/24HR 24 hr patch    NICODERM CQ    30 patch    Place 1 patch onto the skin every 24 hours    Encounter for smoking cessation counseling       nicotine polacrilex 4 MG gum    EQ NICOTINE    270 tablet    Place 1 each (4 mg) inside cheek as needed for smoking cessation    Encounter for smoking cessation counseling

## 2019-10-01 ENCOUNTER — HEALTH MAINTENANCE LETTER (OUTPATIENT)
Age: 44
End: 2019-10-01

## 2020-03-02 ENCOUNTER — OFFICE VISIT (OUTPATIENT)
Dept: OBGYN | Facility: CLINIC | Age: 45
End: 2020-03-02
Payer: COMMERCIAL

## 2020-03-02 VITALS
TEMPERATURE: 97.3 F | BODY MASS INDEX: 21.09 KG/M2 | HEART RATE: 87 BPM | SYSTOLIC BLOOD PRESSURE: 114 MMHG | DIASTOLIC BLOOD PRESSURE: 74 MMHG | WEIGHT: 120 LBS

## 2020-03-02 DIAGNOSIS — Z01.419 WELL WOMAN EXAM WITH ROUTINE GYNECOLOGICAL EXAM: Primary | ICD-10-CM

## 2020-03-02 DIAGNOSIS — Z30.430 ENCOUNTER FOR INSERTION OF MIRENA IUD: ICD-10-CM

## 2020-03-02 DIAGNOSIS — Z30.433 ENCOUNTER FOR REMOVAL AND REINSERTION OF INTRAUTERINE CONTRACEPTIVE DEVICE: ICD-10-CM

## 2020-03-02 LAB — HCG UR QL: NEGATIVE

## 2020-03-02 PROCEDURE — 81025 URINE PREGNANCY TEST: CPT | Performed by: ADVANCED PRACTICE MIDWIFE

## 2020-03-02 PROCEDURE — 99396 PREV VISIT EST AGE 40-64: CPT | Mod: 25 | Performed by: ADVANCED PRACTICE MIDWIFE

## 2020-03-02 PROCEDURE — 58301 REMOVE INTRAUTERINE DEVICE: CPT | Performed by: ADVANCED PRACTICE MIDWIFE

## 2020-03-02 PROCEDURE — 58300 INSERT INTRAUTERINE DEVICE: CPT | Performed by: ADVANCED PRACTICE MIDWIFE

## 2020-03-02 NOTE — NURSING NOTE
"Initial /74 (BP Location: Left arm, Patient Position: Chair, Cuff Size: Adult Regular)   Pulse 87   Temp 97.3  F (36.3  C) (Tympanic)   Wt 54.4 kg (120 lb)   Breastfeeding No   BMI 21.09 kg/m   Estimated body mass index is 21.09 kg/m  as calculated from the following:    Height as of 10/8/18: 1.607 m (5' 3.25\").    Weight as of this encounter: 54.4 kg (120 lb). .    Denae Espinoza MA    "

## 2020-03-02 NOTE — PROGRESS NOTES
SUBJECTIVE:   CC: Melania Moya is an 44 year old woman who presents for preventive health visit.     Healthy Habits:    Do you get at least three servings of calcium containing foods daily (dairy, green leafy vegetables, etc.)? yes    Amount of exercise or daily activities, outside of work: 3 day(s) per week    Problems taking medications regularly No    Medication side effects: No    Have you had an eye exam in the past two years? no    Do you see a dentist twice per year? yes    Do you have sleep apnea, excessive snoring or daytime drowsiness?no      none    Today's PHQ-2 Score:   PHQ-2 ( 1999 [employer]) 3/2/2020 6/22/2018   Q1: Little interest or pleasure in doing things 0 0   Q2: Feeling down, depressed or hopeless 1 1   PHQ-2 Score 1 1       Abuse: Current or Past(Physical, Sexual or Emotional)- No  Do you feel safe in your environment? Yes        Social History     Tobacco Use     Smoking status: Current Every Day Smoker     Packs/day: 0.50     Years: 20.00     Pack years: 10.00     Types: Cigarettes     Smokeless tobacco: Never Used     Tobacco comment: not every day   Substance Use Topics     Alcohol use: Yes     Alcohol/week: 0.0 standard drinks     Comment: occ     If you drink alcohol do you typically have >3 drinks per day or >7 drinks per week? No                     Reviewed orders with patient.  Reviewed health maintenance and updated orders accordingly - Yes  Labs reviewed in Psychiatric    Mammogram Screening: Patient under age 50, mutual decision reflected in health maintenance.  She would like a mammogram.        Pertinent mammograms are reviewed under the imaging tab.  History of abnormal Pap smear: NO - age 30-65 PAP every 5 years with negative HPV co-testing recommended  PAP / HPV Latest Ref Rng & Units 6/22/2018 11/24/2014 12/19/2011   PAP - NIL NIL NIL   HPV 16 DNA NEG:Negative Negative - -   HPV 18 DNA NEG:Negative Negative - -   OTHER HR HPV NEG:Negative Negative - -     Reviewed and updated  as needed this visit by clinical staff  Tobacco  Allergies  Meds         Reviewed and updated as needed this visit by Provider        Past Medical History:   Diagnosis Date     Female infertility of unspecified origin     Conceived without meds, but was on clomid for several cycles first.        ROS:  CONSTITUTIONAL: NEGATIVE for fever, chills, change in weight  INTEGUMENTARU/SKIN: NEGATIVE for worrisome rashes, moles or lesions  EYES: NEGATIVE for vision changes or irritation  ENT: NEGATIVE for ear, mouth and throat problems  RESP: NEGATIVE for significant cough or SOB  BREAST: NEGATIVE for masses, tenderness or discharge  CV: NEGATIVE for chest pain, palpitations or peripheral edema  GI: NEGATIVE for nausea, abdominal pain, heartburn, or change in bowel habits  : NEGATIVE for unusual urinary or vaginal symptoms. Periods are regular.  MUSCULOSKELETAL: NEGATIVE for significant arthralgias or myalgia  NEURO: NEGATIVE for weakness, dizziness or paresthesias  PSYCHIATRIC: NEGATIVE for changes in mood or affect    OBJECTIVE:   /74 (BP Location: Left arm, Patient Position: Chair, Cuff Size: Adult Regular)   Pulse 87   Temp 97.3  F (36.3  C) (Tympanic)   Wt 54.4 kg (120 lb)   Breastfeeding No   BMI 21.09 kg/m    EXAM:  GENERAL: healthy, alert and no distress  EYES: Eyes grossly normal to inspection, PERRL and conjunctivae and sclerae normal  HENT: ear canals and TM's normal, nose and mouth without ulcers or lesions  NECK: no adenopathy, no asymmetry, masses, or scars and thyroid normal to palpation  RESP: lungs clear to auscultation - no rales, rhonchi or wheezes  BREAST: normal without masses, tenderness or nipple discharge and no palpable axillary masses or adenopathy  CV: regular rate and rhythm, normal S1 S2, no S3 or S4, no murmur, click or rub, no peripheral edema and peripheral pulses strong  ABDOMEN: soft, nontender, no hepatosplenomegaly, no masses and bowel sounds normal   (female): normal  "female external genitalia, normal urethral meatus, vaginal mucosa pink, moist, well rugated, and normal cervix/adnexa/uterus without masses or discharge  MS: no gross musculoskeletal defects noted, no edema  SKIN: no suspicious lesions or rashes  NEURO: Normal strength and tone, mentation intact and speech normal  PSYCH: mentation appears normal, affect normal/bright    Diagnostic Test Results:  Labs reviewed in Epic    ASSESSMENT/PLAN:   1. Well woman exam with routine gynecological exam  - HCG Qual, Urine ([id number])    2. Encounter for removal and reinsertion of intrauterine contraceptive device  See procedure not below   - levonorgestrel (MIRENA) 20 MCG/24HR IUD 20 mcg  - levonorgestrel (MIRENA) 20 MCG/24HR IUD; 1 each (20 mcg) by Intrauterine route once  - INSERTION INTRAUTERINE DEVICE  - REMOVE INTRAUTERINE DEVICE    COUNSELING:   Reviewed preventive health counseling, as reflected in patient instructions       Regular exercise       Healthy diet/nutrition       Contraception       (Shayy)menopause management    Estimated body mass index is 21.09 kg/m  as calculated from the following:    Height as of 10/8/18: 1.607 m (5' 3.25\").    Weight as of this encounter: 54.4 kg (120 lb).         reports that she has been smoking cigarettes. She has a 10.00 pack-year smoking history. She has never used smokeless tobacco.  Tobacco Cessation Action Plan: Information offered: Patient not interested at this time    Counseling Resources:  ATP IV Guidelines  Pooled Cohorts Equation Calculator  Breast Cancer Risk Calculator  FRAX Risk Assessment  ICSI Preventive Guidelines  Dietary Guidelines for Americans,   USDA's MyPlate  ASA Prophylaxis  Lung CA Screening    Maritza Morris, JOHNNIE  [facility]    IUD Removal and Reinsertion     SUBJECTIVE:    Is a pregnancy test required: Yes.  Was it positive or negative?  Negative  Was a consent obtained?  Yes    Subjective: Melania Moya is a 44 year old  " presents for IUD and desires Mirena type IUD.  She requests removal of the IUD because the IUD effectiveness has     Patient has been given the opportunity to ask questions about all forms of birth control, including all options appropriate for Melania Moya. Discussed that no method of birth control, except abstinence is 100% effective against pregnancy or sexually transmitted infection.     Melania Moya understands she may have the IUD removed at any time. IUD should be removed by a health care provider and the current IUD will be removed today.    The entire removal and insertion procedure was reviewed with the patient, including care after placement.    Today's PHQ-2 Score:   PHQ-2 (  [employer]) 3/2/2020   Q1: Little interest or pleasure in doing things 0   Q2: Feeling down, depressed or hopeless 1   PHQ-2 Score 1       PROCEDURE:    A speculum exam was performed and the cervix was visualized. The IUD string was visualized. Using ring forceps, the string  was grasped and the IUD removed intact.    Under sterile technique, cervix was visualized with speculum and prepped with Betadine solution swab x 3. Tenaculum was placed for stability. The uterus was gently straightened and sounded to 7.0 cm. IUD prepared for placement, and IUD inserted according to 's instructions without difficulty or significant ressitance, and deployed at the fundus. The strings were visualized and trimmed to 7.0 cm from the external os. Tenaculum was removed and hemostasis noted. Speculum removed.  Patient tolerated procedure well.    EBL: minimal    Complications: none      POST PROCEDURE:    Given 's handouts, including when to have IUD removed, list of danger s/sx, side effects and follow up recommended. Encouraged condom use for prevention of STD. Advised to call for any fever, for prolonged or severe pain or bleeding, abnormal vaginal dischage, or unable to palpate strings. She was advised  to use pain medications (ibuprofen) as needed for mild to moderate pain. Advised to follow-up in clinic in 4-6 weeks for IUD string check if unable to find strings or as directed by provider.     Maritza Morris CNM

## 2020-03-06 ENCOUNTER — HOSPITAL ENCOUNTER (OUTPATIENT)
Dept: MAMMOGRAPHY | Facility: CLINIC | Age: 45
Discharge: HOME | End: 2020-03-06
Attending: OBSTETRICS & GYNECOLOGY | Admitting: OBSTETRICS & GYNECOLOGY
Payer: COMMERCIAL

## 2020-03-06 DIAGNOSIS — Z12.31 VISIT FOR SCREENING MAMMOGRAM: ICD-10-CM

## 2020-03-06 PROCEDURE — 77067 SCR MAMMO BI INCL CAD: CPT

## 2020-07-06 ENCOUNTER — HOSPITAL ENCOUNTER (EMERGENCY)
Facility: CLINIC | Age: 45
Discharge: HOME | End: 2020-07-06
Attending: PHYSICIAN ASSISTANT | Admitting: PHYSICIAN ASSISTANT
Payer: COMMERCIAL

## 2020-07-06 VITALS
HEART RATE: 100 BPM | BODY MASS INDEX: 20.38 KG/M2 | OXYGEN SATURATION: 98 % | WEIGHT: 115 LBS | RESPIRATION RATE: 16 BRPM | DIASTOLIC BLOOD PRESSURE: 91 MMHG | HEIGHT: 63 IN | TEMPERATURE: 98.1 F | SYSTOLIC BLOOD PRESSURE: 150 MMHG

## 2020-07-06 DIAGNOSIS — S61.215A LACERATION OF LEFT RING FINGER WITHOUT FOREIGN BODY WITHOUT DAMAGE TO NAIL, INITIAL ENCOUNTER: ICD-10-CM

## 2020-07-06 PROCEDURE — 99283 EMERGENCY DEPT VISIT LOW MDM: CPT | Mod: 25 | Performed by: PHYSICIAN ASSISTANT

## 2020-07-06 PROCEDURE — 12001 RPR S/N/AX/GEN/TRNK 2.5CM/<: CPT | Performed by: PHYSICIAN ASSISTANT

## 2020-07-06 PROCEDURE — 12001 RPR S/N/AX/GEN/TRNK 2.5CM/<: CPT | Mod: Z6 | Performed by: PHYSICIAN ASSISTANT

## 2020-07-06 PROCEDURE — 25000128 H RX IP 250 OP 636: Performed by: PHYSICIAN ASSISTANT

## 2020-07-06 PROCEDURE — 90715 TDAP VACCINE 7 YRS/> IM: CPT | Performed by: PHYSICIAN ASSISTANT

## 2020-07-06 PROCEDURE — 90471 IMMUNIZATION ADMIN: CPT | Performed by: PHYSICIAN ASSISTANT

## 2020-07-06 PROCEDURE — 99282 EMERGENCY DEPT VISIT SF MDM: CPT | Mod: 25 | Performed by: PHYSICIAN ASSISTANT

## 2020-07-06 RX ADMIN — CLOSTRIDIUM TETANI TOXOID ANTIGEN (FORMALDEHYDE INACTIVATED), CORYNEBACTERIUM DIPHTHERIAE TOXOID ANTIGEN (FORMALDEHYDE INACTIVATED), BORDETELLA PERTUSSIS TOXOID ANTIGEN (GLUTARALDEHYDE INACTIVATED), BORDETELLA PERTUSSIS FILAMENTOUS HEMAGGLUTININ ANTIGEN (FORMALDEHYDE INACTIVATED), BORDETELLA PERTUSSIS PERTACTIN ANTIGEN, AND BORDETELLA PERTUSSIS FIMBRIAE 2/3 ANTIGEN 0.5 ML: 5; 2; 2.5; 5; 3; 5 INJECTION, SUSPENSION INTRAMUSCULAR at 22:01

## 2020-07-06 ASSESSMENT — ENCOUNTER SYMPTOMS
WOUND: 1
MUSCULOSKELETAL NEGATIVE: 1
CONSTITUTIONAL NEGATIVE: 1
NEUROLOGICAL NEGATIVE: 1

## 2020-07-06 ASSESSMENT — MIFFLIN-ST. JEOR: SCORE: 1135.77

## 2020-07-06 NOTE — ED AVS SNAPSHOT
[facility] Emergency Department  [address] [address]  Phone:  [phone]  Fax:  [phone]                                    Melania Moya   MRN: [id number]    Department:  [facility] Emergency Department   Date of Visit:  7/6/2020           After Visit Summary Signature Page    I have received my discharge instructions, and my questions have been answered. I have discussed any challenges I see with this plan with the nurse or doctor.    ..........................................................................................................................................  Patient/Patient Representative Signature      ..........................................................................................................................................  Patient Representative Print Name and Relationship to Patient    ..................................................               ................................................  Date                                   Time    ..........................................................................................................................................  Reviewed by Signature/Title    ...................................................              ..............................................  Date                                               Time          22EPIC Rev 08/18

## 2020-07-07 NOTE — ED PROVIDER NOTES
History     Chief Complaint   Patient presents with     Laceration     fing     HPI  Melania Moya is a 44 year old female who presents to the emergency department today with finger laceration to the left ring finger.  Patient states she accidentally cut it with a pruning shear when she was gardening tonight.  Patient was not wearing garden gloves at the time.  Patient denies any numbness, weakness, decreased range of motion or foreign body/bony involvement.    Patient's last tetanus was in 2007.        Allergies:  Allergies   Allergen Reactions     Bactrim [Sulfamethoxazole W-Trimethoprim] Hives     Penicillins Hives       Problem List:    Patient Active Problem List    Diagnosis Date Noted     Encounter for insertion of mirena IUD 03/02/2020     Priority: Medium     mirena placed   Remove 2025  Lot [id number]  Exp 04/2022  Denae Espinoza MA         Reactive depression (situational) 04/30/2014     Priority: Medium     Breast lump 12/19/2011     Priority: Medium     Tobacco use disorder 08/30/2007     Priority: Medium        Past Medical History:    Past Medical History:   Diagnosis Date     Female infertility of unspecified origin        Past Surgical History:    Past Surgical History:   Procedure Laterality Date     C ORAL SURGERY PROCEDURE  age 18    [place] teeth       Family History:    Family History   Problem Relation Age of Onset     Cancer Father         lymphoma     Family History Negative Other      Breast Cancer No family hx of      Cancer - colorectal No family hx of        Social History:  Marital Status:   [2]  Social History     Tobacco Use     Smoking status: Current Every Day Smoker     Packs/day: 0.50     Years: 20.00     Pack years: 10.00     Types: Cigarettes     Smokeless tobacco: Never Used     Tobacco comment: not every day   Substance Use Topics     Alcohol use: Yes     Alcohol/week: 0.0 standard drinks     Comment: occ     Drug use: No        Medications:    levonorgestrel  "(MIRENA) 20 MCG/24HR IUD  levonorgestrel (MIRENA) 20 MCG/24HR IUD  Multiple Vitamin (DAILY MULTIVITAMIN PO)      Review of Systems   Constitutional: Negative.    Musculoskeletal: Negative.    Skin: Positive for wound (Left fourth finger laceration).   Neurological: Negative.    All other systems reviewed and are negative.      Physical Exam   BP: (!) 150/91  Pulse: 100  Temp: 98.1  F (36.7  C)  Resp: 16  Height: 160 cm (5' 3\")  Weight: 52.2 kg (115 lb)  SpO2: 98 %      Physical Exam  Vitals signs and nursing note reviewed.   Constitutional:       General: She is not in acute distress.     Appearance: Normal appearance. She is normal weight. She is not ill-appearing or toxic-appearing.   Musculoskeletal:      Left hand: She exhibits tenderness and laceration. She exhibits normal range of motion, no bony tenderness, normal two-point discrimination, normal capillary refill and no swelling. Normal sensation noted. Normal strength noted.      Comments: Patient neurovascularly intact in left hand and full range of motion in an fourth digit in all directions with and without resistance.   Skin:     General: Skin is warm.      Findings: Laceration (to the palmar aspect of the 4th finger left hand between the DIP and PIP joint. ) present.   Neurological:      General: No focal deficit present.      Mental Status: She is alert and oriented to person, place, and time.      GCS: GCS eye subscore is 4. GCS verbal subscore is 5. GCS motor subscore is 6.      Sensory: Sensation is intact.      Motor: Motor function is intact.   Psychiatric:         Mood and Affect: Mood normal.         Behavior: Behavior normal.         Thought Content: Thought content normal.         Judgment: Judgment normal.         ED Course        [facility]    -Laceration Repair    Date/Time: 7/6/2020 10:34 PM  Performed by: Dawn Small PA-C  Authorized by: Dawn Small PA-C       ANESTHESIA (see MAR for exact " dosages):     Anesthesia method:  Local infiltration    Local anesthetic:  Bupivacaine 0.25% w/o epi  LACERATION DETAILS     Location:  Finger    Finger location:  L ring finger    Length (cm):  1.5    REPAIR TYPE:     Repair type:  Simple      EXPLORATION:     Wound exploration: wound explored through full range of motion and entire depth of wound probed and visualized      Wound extent: no foreign body and no tendon damage      Contaminated: no      TREATMENT:     Area cleansed with:  Hibiclens and saline    Amount of cleaning:  Standard    Irrigation method:  Syringe    SKIN REPAIR     Repair method:  Sutures    Suture size:  5-0    Suture material:  Nylon    Number of sutures:  3    APPROXIMATION     Approximation:  Close    POST-PROCEDURE DETAILS     Dressing:  Antibiotic ointment and adhesive bandage      PROCEDURE   Patient Tolerance:  Patient tolerated the procedure well with no immediate complications                    Critical Care time:  none               No results found for this or any previous visit (from the past 24 hour(s)).    Medications   Tdap (tetanus-diphtheria-acell pertussis) (ADACEL) injection 0.5 mL (0.5 mLs Intramuscular Given 7/6/20 2201)       Assessments & Plan (with Medical Decision Making)     I have reviewed the nursing notes.    I have reviewed the findings, diagnosis, plan and need for follow up with the patient.   44-year-old female presents emergency department with laceration to the left fourth finger that occurred just prior to arrival.  Patient's Tdap was updated in office today and 3 sutures placed to finger laceration without complication.  Patient tolerated procedure well.  No concerns for tendon injury or bony involvement.  Patient discharged in stable condition informed have sutures removed in 7 to 9 days.  Patient return sooner if signs or symptoms of infection occur these were discussed with patient and given on discharge paperwork.    Discharge Medication List as of  7/6/2020  9:59 PM          Final diagnoses:   Laceration of left ring finger without foreign body without damage to nail, initial encounter       7/6/2020   [facility] EMERGENCY DEPARTMENT     Dawn Small PA-C  07/06/20 3148

## 2020-07-07 NOTE — DISCHARGE INSTRUCTIONS
After care instructions:  Keep wound clean and dry for the next 24-48 hours  Sutures out in 7-9 days  Signs of infection discussed today  Apply anti-bacterial ointment for 2 days  May return to work as long as wound is kept clean and dry  Discussed the probability of scarring  Active range of motion exercises encouraged    Tetanus given in office today     Follow up with PCP or return to care in 7-9 days for suture removal.    Return to clinic sooner or go to Emergency Room if symptoms worsen or change or signs of infection occur (redness, red streaking, warmth, increased pain, increased swelling, purulent drainage, or fevers occur.)    May use acetaminophen, ibuprofen as needed.     Patient voiced understanding of instructions given.

## 2021-01-15 ENCOUNTER — HEALTH MAINTENANCE LETTER (OUTPATIENT)
Age: 46
End: 2021-01-15

## 2021-05-15 ENCOUNTER — HEALTH MAINTENANCE LETTER (OUTPATIENT)
Age: 46
End: 2021-05-15

## 2021-09-04 ENCOUNTER — HEALTH MAINTENANCE LETTER (OUTPATIENT)
Age: 46
End: 2021-09-04

## 2022-02-19 ENCOUNTER — HEALTH MAINTENANCE LETTER (OUTPATIENT)
Age: 47
End: 2022-02-19

## 2022-06-11 ENCOUNTER — HEALTH MAINTENANCE LETTER (OUTPATIENT)
Age: 47
End: 2022-06-11

## 2023-03-22 ENCOUNTER — HOSPITAL ENCOUNTER (OUTPATIENT)
Dept: MAMMOGRAPHY | Facility: CLINIC | Age: 48
Discharge: HOME | End: 2023-03-22
Admitting: OBSTETRICS & GYNECOLOGY
Payer: COMMERCIAL

## 2023-03-22 DIAGNOSIS — Z12.31 VISIT FOR SCREENING MAMMOGRAM: ICD-10-CM

## 2023-03-22 PROCEDURE — 77067 SCR MAMMO BI INCL CAD: CPT

## 2024-11-08 ENCOUNTER — OFFICE VISIT (OUTPATIENT)
Dept: INTERNAL MEDICINE | Facility: CLINIC | Age: 49
End: 2024-11-08
Payer: COMMERCIAL

## 2024-11-08 VITALS
DIASTOLIC BLOOD PRESSURE: 89 MMHG | HEIGHT: 63 IN | OXYGEN SATURATION: 100 % | SYSTOLIC BLOOD PRESSURE: 139 MMHG | RESPIRATION RATE: 20 BRPM | WEIGHT: 129.38 LBS | HEART RATE: 99 BPM | TEMPERATURE: 97.5 F | BODY MASS INDEX: 22.93 KG/M2

## 2024-11-08 DIAGNOSIS — H66.90 EAR INFECTION: ICD-10-CM

## 2024-11-08 DIAGNOSIS — J20.9 ACUTE BRONCHITIS, UNSPECIFIED ORGANISM: Primary | ICD-10-CM

## 2024-11-08 PROCEDURE — 99214 OFFICE O/P EST MOD 30 MIN: CPT

## 2024-11-08 RX ORDER — DOXYCYCLINE 100 MG/1
100 CAPSULE ORAL 2 TIMES DAILY
Qty: 20 CAPSULE | Refills: 0 | Status: SHIPPED | OUTPATIENT
Start: 2024-11-08

## 2024-11-08 RX ORDER — METHYLPREDNISOLONE 4 MG/1
TABLET ORAL
Qty: 21 TABLET | Refills: 0 | Status: SHIPPED | OUTPATIENT
Start: 2024-11-08

## 2024-11-08 RX ORDER — BENZONATATE 100 MG/1
100 CAPSULE ORAL 3 TIMES DAILY PRN
Qty: 30 CAPSULE | Refills: 0 | Status: SHIPPED | OUTPATIENT
Start: 2024-11-08

## 2024-11-08 RX ORDER — INHALER, ASSIST DEVICES
SPACER (EA) MISCELLANEOUS
Qty: 1 EACH | Refills: 0 | Status: SHIPPED | OUTPATIENT
Start: 2024-11-08

## 2024-11-08 RX ORDER — ALBUTEROL SULFATE 90 UG/1
2 INHALANT RESPIRATORY (INHALATION) EVERY 4 HOURS PRN
Qty: 18 G | Refills: 1 | Status: SHIPPED | OUTPATIENT
Start: 2024-11-08

## 2024-11-08 ASSESSMENT — PAIN SCALES - GENERAL: PAINLEVEL_OUTOF10: MODERATE PAIN (4)

## 2024-11-08 ASSESSMENT — PATIENT HEALTH QUESTIONNAIRE - PHQ9
SUM OF ALL RESPONSES TO PHQ QUESTIONS 1-9: 3
SUM OF ALL RESPONSES TO PHQ QUESTIONS 1-9: 3

## 2024-11-08 NOTE — PROGRESS NOTES
Assessment & Plan     (J20.9) Acute bronchitis, unspecified organism  (primary encounter diagnosis)  Comment: Patient seen in clinic for ongoing cold symptoms, cough, chest congestion, wheeze and ear pain. Not wheeze to left lung fields. Discussed medication options to help manage symptoms. Patient acknowledged and agreed to plan of care.   Plan: REVIEW OF HEALTH MAINTENANCE PROTOCOL ORDERS,         albuterol (PROAIR HFA/PROVENTIL HFA/VENTOLIN         HFA) 108 (90 Base) MCG/ACT inhaler, spacer         (OPTICHAMBER MILLY) holding chamber,         methylPREDNISolone (MEDROL DOSEPAK) 4 MG tablet        therapy pack, benzonatate (TESSALON) 100 MG         capsule        Prescription sent to pharmacy     (H66.90) Ear infection  Comment: Noted on exam very reddened ear canal, patient has been having on going ear pain for several days stated it is hard to sleep.   Plan: doxycycline hyclate (VIBRAMYCIN) 100 MG capsule        Prescription sent to pharmacy          Nicotine/Tobacco Cessation  She reports that she has been smoking cigarettes. She has a 10 pack-year smoking history. She has never been exposed to tobacco smoke. She has never used smokeless tobacco.  Nicotine/Tobacco Cessation Plan  Information offered: Patient not interested at this time            Ericka Velázquez is a 49 year old, presenting for the following health issues:  Cold Symptoms (Ear problem, Sinus pressure)        11/8/2024     9:13 AM   Additional Questions   Roomed by Desiree Maria     History of Present Illness       Reason for visit:  Ear or sinus inrection  Symptom onset:  1-2 weeks ago   She is taking medications regularly.                 Review of Systems  Constitutional, HEENT, cardiovascular, pulmonary, gi and gu systems are negative, except as otherwise noted.      Objective    /89 (BP Location: Left arm, Patient Position: Chair, Cuff Size: Adult Regular)   Pulse 99   Temp 97.5  F (36.4  C) (Temporal)   Resp 20   Ht  "1.6 m (5' 3\")   Wt 58.7 kg (129 lb 6 oz)   LMP  (LMP Unknown)   SpO2 100%   BMI 22.92 kg/m    Body mass index is 22.92 kg/m .  Physical Exam  HENT:      Right Ear: Tympanic membrane, ear canal and external ear normal. There is no impacted cerumen.      Left Ear: There is impacted cerumen.      Ears:      Comments: Noted impacted left ear canal      Mouth/Throat:      Mouth: Mucous membranes are moist.      Pharynx: No oropharyngeal exudate or posterior oropharyngeal erythema.   Eyes:      General:         Right eye: No discharge.         Left eye: No discharge.      Conjunctiva/sclera: Conjunctivae normal.      Pupils: Pupils are equal, round, and reactive to light.   Cardiovascular:      Pulses: Normal pulses.      Heart sounds: Normal heart sounds. No murmur heard.     No friction rub.   Pulmonary:      Effort: Pulmonary effort is normal.      Breath sounds: Wheezing present.      Comments: Left lung fields  Musculoskeletal:         General: No swelling, tenderness, deformity or signs of injury. Normal range of motion.   Skin:     General: Skin is warm and dry.      Coloration: Skin is not jaundiced or pale.      Findings: No bruising or erythema.   Neurological:      General: No focal deficit present.      Mental Status: She is oriented to person, place, and time.   Psychiatric:         Mood and Affect: Mood normal.         Behavior: Behavior normal.         Thought Content: Thought content normal.         Judgment: Judgment normal.                    Signed Electronically by: ELBA Cooper CNP    "

## 2024-11-21 ENCOUNTER — HOSPITAL ENCOUNTER (OUTPATIENT)
Dept: MAMMOGRAPHY | Facility: CLINIC | Age: 49
Discharge: HOME | End: 2024-11-21
Payer: COMMERCIAL

## 2024-11-21 DIAGNOSIS — Z12.31 VISIT FOR SCREENING MAMMOGRAM: ICD-10-CM

## 2024-11-21 PROCEDURE — 77063 BREAST TOMOSYNTHESIS BI: CPT

## 2024-11-21 PROCEDURE — 77067 SCR MAMMO BI INCL CAD: CPT

## 2024-12-22 ENCOUNTER — HEALTH MAINTENANCE LETTER (OUTPATIENT)
Age: 49
End: 2024-12-22

## 2025-03-10 ENCOUNTER — TELEPHONE (OUTPATIENT)
Dept: INTERNAL MEDICINE | Facility: CLINIC | Age: 50
End: 2025-03-10
Payer: COMMERCIAL

## 2025-03-10 NOTE — TELEPHONE ENCOUNTER
Patient Quality Outreach    Patient is due for the following:   Colon Cancer Screening  Cervical Cancer Screening - PAP Needed  Physical Preventive Adult Physical    Action(s) Taken:   Schedule a Adult Preventative    Type of outreach:    Sent "[employer] message.    Questions for provider review:    None           Jacqueline Kelly CMA  Chart routed to closing chart  .

## 2025-08-24 ENCOUNTER — E-VISIT (OUTPATIENT)
Dept: URGENT CARE | Facility: CLINIC | Age: 50
End: 2025-08-24
Payer: COMMERCIAL

## 2025-08-24 ENCOUNTER — HOSPITAL ENCOUNTER (EMERGENCY)
Facility: CLINIC | Age: 50
Discharge: HOME | End: 2025-08-24
Payer: COMMERCIAL

## 2025-08-24 VITALS
TEMPERATURE: 98.2 F | RESPIRATION RATE: 17 BRPM | HEART RATE: 89 BPM | SYSTOLIC BLOOD PRESSURE: 151 MMHG | DIASTOLIC BLOOD PRESSURE: 84 MMHG | OXYGEN SATURATION: 98 %

## 2025-08-24 DIAGNOSIS — L03.213 PERIORBITAL CELLULITIS OF LEFT EYE: ICD-10-CM

## 2025-08-24 DIAGNOSIS — H01.009 BLEPHARITIS: Primary | ICD-10-CM

## 2025-08-24 DIAGNOSIS — H02.849 SWELLING OF EYELID, UNSPECIFIED LATERALITY: Primary | ICD-10-CM

## 2025-08-24 PROCEDURE — G0463 HOSPITAL OUTPT CLINIC VISIT: HCPCS

## 2025-08-24 PROCEDURE — 99214 OFFICE O/P EST MOD 30 MIN: CPT

## 2025-08-24 PROCEDURE — 99207 PR NON-BILLABLE SERV PER CHARTING: CPT | Performed by: NURSE PRACTITIONER

## 2025-08-24 RX ORDER — DOXYCYCLINE 100 MG/1
100 CAPSULE ORAL 2 TIMES DAILY
Qty: 20 CAPSULE | Refills: 0 | Status: SHIPPED | OUTPATIENT
Start: 2025-08-24 | End: 2025-09-03

## 2025-08-24 RX ORDER — PREDNISONE 20 MG/1
TABLET ORAL
Qty: 10 TABLET | Refills: 0 | Status: SHIPPED | OUTPATIENT
Start: 2025-08-24

## 2025-08-24 ASSESSMENT — COLUMBIA-SUICIDE SEVERITY RATING SCALE - C-SSRS
1. IN THE PAST MONTH, HAVE YOU WISHED YOU WERE DEAD OR WISHED YOU COULD GO TO SLEEP AND NOT WAKE UP?: NO
2. HAVE YOU ACTUALLY HAD ANY THOUGHTS OF KILLING YOURSELF IN THE PAST MONTH?: NO
6. HAVE YOU EVER DONE ANYTHING, STARTED TO DO ANYTHING, OR PREPARED TO DO ANYTHING TO END YOUR LIFE?: NO

## 2025-08-24 ASSESSMENT — ACTIVITIES OF DAILY LIVING (ADL): ADLS_ACUITY_SCORE: 41
